# Patient Record
Sex: FEMALE | Race: AMERICAN INDIAN OR ALASKA NATIVE
[De-identification: names, ages, dates, MRNs, and addresses within clinical notes are randomized per-mention and may not be internally consistent; named-entity substitution may affect disease eponyms.]

---

## 2020-08-30 ENCOUNTER — HOSPITAL ENCOUNTER (EMERGENCY)
Dept: HOSPITAL 5 - ED | Age: 70
Discharge: OTHER INSTITUTION | End: 2020-08-30
Payer: SELF-PAY

## 2020-08-30 VITALS — DIASTOLIC BLOOD PRESSURE: 59 MMHG | SYSTOLIC BLOOD PRESSURE: 96 MMHG

## 2020-08-30 DIAGNOSIS — A41.89: ICD-10-CM

## 2020-08-30 DIAGNOSIS — I46.9: Primary | ICD-10-CM

## 2020-08-30 DIAGNOSIS — Z88.4: ICD-10-CM

## 2020-08-30 DIAGNOSIS — D64.9: ICD-10-CM

## 2020-08-30 LAB
%HYPO/RBC NFR BLD AUTO: (no result) %
ALBUMIN SERPL-MCNC: 2.4 G/DL (ref 3.9–5)
ALT SERPL-CCNC: 57 UNITS/L (ref 7–56)
ANISOCYTOSIS BLD QL SMEAR: (no result)
APTT BLD: 36.4 SEC. (ref 24.2–36.6)
BAND NEUTROPHILS # (MANUAL): 1.6 K/MM3
BILIRUB DIRECT SERPL-MCNC: < 0.2 MG/DL (ref 0–0.2)
BUN SERPL-MCNC: 24 MG/DL (ref 7–17)
BUN/CREAT SERPL: 12 %
CALCIUM SERPL-MCNC: 7.9 MG/DL (ref 8.4–10.2)
HCO3 BLDA-SCNC: 13.5 MMOL/L (ref 20–26)
HCT VFR BLD CALC: 14 % (ref 30.3–42.9)
HCT VFR BLD CALC: 24.6 % (ref 30.3–42.9)
HDLC SERPL-MCNC: 41 MG/DL (ref 40–59)
HEMOLYSIS INDEX: 5
HGB BLD-MCNC: 4.5 GM/DL (ref 10.1–14.3)
HGB BLD-MCNC: 8 GM/DL (ref 10.1–14.3)
INR PPP: 1.2 (ref 0.87–1.13)
MCHC RBC AUTO-ENTMCNC: 32 % (ref 30–34)
MCV RBC AUTO: 94 FL (ref 79–97)
MYELOCYTES # (MANUAL): 0 K/MM3
PCO2 BLDA: 26.3 MM HG
PH BLDA: 7.33 PH UNITS (ref 7.35–7.45)
PLATELET # BLD: 180 K/MM3 (ref 140–440)
PO2 BLDA: 435 MM HG (ref 80–90)
PROMYELOCYTES # (MANUAL): 0 K/MM3
RBC # BLD AUTO: 1.5 M/MM3 (ref 3.65–5.03)
TOTAL CELLS COUNTED BLD: 100

## 2020-08-30 PROCEDURE — 80048 BASIC METABOLIC PNL TOTAL CA: CPT

## 2020-08-30 PROCEDURE — 70450 CT HEAD/BRAIN W/O DYE: CPT

## 2020-08-30 PROCEDURE — 74176 CT ABD & PELVIS W/O CONTRAST: CPT

## 2020-08-30 PROCEDURE — 87205 SMEAR GRAM STAIN: CPT

## 2020-08-30 PROCEDURE — 87040 BLOOD CULTURE FOR BACTERIA: CPT

## 2020-08-30 PROCEDURE — 85018 HEMOGLOBIN: CPT

## 2020-08-30 PROCEDURE — 80076 HEPATIC FUNCTION PANEL: CPT

## 2020-08-30 PROCEDURE — 86901 BLOOD TYPING SEROLOGIC RH(D): CPT

## 2020-08-30 PROCEDURE — P9016 RBC LEUKOCYTES REDUCED: HCPCS

## 2020-08-30 PROCEDURE — 86850 RBC ANTIBODY SCREEN: CPT

## 2020-08-30 PROCEDURE — 96361 HYDRATE IV INFUSION ADD-ON: CPT

## 2020-08-30 PROCEDURE — 36415 COLL VENOUS BLD VENIPUNCTURE: CPT

## 2020-08-30 PROCEDURE — 85014 HEMATOCRIT: CPT

## 2020-08-30 PROCEDURE — 94002 VENT MGMT INPAT INIT DAY: CPT

## 2020-08-30 PROCEDURE — 71045 X-RAY EXAM CHEST 1 VIEW: CPT

## 2020-08-30 PROCEDURE — 31500 INSERT EMERGENCY AIRWAY: CPT

## 2020-08-30 PROCEDURE — 99285 EMERGENCY DEPT VISIT HI MDM: CPT

## 2020-08-30 PROCEDURE — 85730 THROMBOPLASTIN TIME PARTIAL: CPT

## 2020-08-30 PROCEDURE — 85610 PROTHROMBIN TIME: CPT

## 2020-08-30 PROCEDURE — 80061 LIPID PANEL: CPT

## 2020-08-30 PROCEDURE — 85007 BL SMEAR W/DIFF WBC COUNT: CPT

## 2020-08-30 PROCEDURE — 86920 COMPATIBILITY TEST SPIN: CPT

## 2020-08-30 PROCEDURE — 36430 TRANSFUSION BLD/BLD COMPNT: CPT

## 2020-08-30 PROCEDURE — 85025 COMPLETE CBC W/AUTO DIFF WBC: CPT

## 2020-08-30 PROCEDURE — 93005 ELECTROCARDIOGRAM TRACING: CPT

## 2020-08-30 PROCEDURE — 85379 FIBRIN DEGRADATION QUANT: CPT

## 2020-08-30 PROCEDURE — 86900 BLOOD TYPING SEROLOGIC ABO: CPT

## 2020-08-30 PROCEDURE — 82803 BLOOD GASES ANY COMBINATION: CPT

## 2020-08-30 PROCEDURE — 96365 THER/PROPH/DIAG IV INF INIT: CPT

## 2020-08-30 PROCEDURE — 84484 ASSAY OF TROPONIN QUANT: CPT

## 2020-08-30 PROCEDURE — 82140 ASSAY OF AMMONIA: CPT

## 2020-08-30 NOTE — XRAY REPORT
CHEST 1 VIEW



INDICATION / CLINICAL INFORMATION:

ETT placement.



COMPARISON: 

8/30/2020.



FINDINGS:



SUPPORT DEVICES: Interval placement of ET tube with its tip approximately 2.6 cm above the level the 
maria luisa.



HEART / MEDIASTINUM: Stable since prior exam. 



LUNGS / PLEURA: Persistent mild central pulmonary vascular congestion. No pneumothorax. 



ADDITIONAL FINDINGS: No significant additional findings.



IMPRESSION:

1. Interval placement of ET tube with its tip approximately 2.6 cm above level of maria luisa.

2. Previously noted findings of congestive heart failure mild central pulmonary vascular congestion a
re similar.



Signer Name: Jorge Crawley MD 

Signed: 8/30/2020 10:21 AM

Workstation Name: [id number]

## 2020-08-30 NOTE — XRAY REPORT
CHEST 1 VIEW



INDICATION / CLINICAL INFORMATION:

Dyspnea.



COMPARISON: 

None available.



FINDINGS:



SUPPORT DEVICES: None.



HEART / MEDIASTINUM: Enlarged cardiac silhouette. 



LUNGS / PLEURA: No confluent infiltrate. Mild central pulmonary vascular congestion. No pneumothorax 
or pleural effusion. 



ADDITIONAL FINDINGS: No significant additional findings.



IMPRESSION:

1. Findings consistent with congestive heart failure and mild central pulmonary vascular congestion.



Signer Name: Jorge Crawley MD 

Signed: 8/30/2020 9:03 AM

Workstation Name: [id number]

## 2020-08-30 NOTE — CAT SCAN REPORT
CT ABDOMEN AND PELVIS WITHOUT CONTRAST



INDICATION:

ABDOMINAL PAIN.



TECHNIQUE:

Axial CT images were obtained through the abdomen and pelvis without IV contrast.  All CT scans at 
is location are performed using CT dose reduction for ALARA by means of automated exposure control. 



COMPARISON:

None available.



FINDINGS:

LOWER CHEST: Tiny left and small right pleural effusions with bibasilar atelectasis

LIVER: No significant abnormality.

GALLBLADDER: No significant abnormality.  

BILE DUCTS: No significant abnormality.

PANCREAS: No significant abnormality.

SPLEEN: No significant abnormality.

ADRENALS: No significant abnormality.

RIGHT KIDNEY and URETER: Moderately atrophic containing multiple tiny intrarenal vascular calcificati
ons and several small acquired cysts.

LEFT KIDNEY and URETER: Moderately atrophic containing several acquired cysts, largest of which measu
res 3 cm.



STOMACH and SMALL BOWEL: Moderately distended stomach containing air-fluid level. Multiple mildly dil
ated fluid-filled loops of proximal and mid small bowel with collapse of distal ileum characteristic 
for partial low-grade distal small bowel obstruction

COLON: Collapsed 

APPENDIX: No significant abnormality.  

PERITONEUM: No free fluid. No free air. Loculated fluid collection within the right hemipelvis measur
es 11.1 x 9.4 cm image 115 and extending into the right flank/right second bilobed collection with he
morrhagic layering fluid is seen measuring 7.3 x 11.7 cm image 93. Retroperitoneal space . Moderate s
ized hematoma with hematocrit level right lower quadrant subcutaneous soft tissues measures 3.9 x 9.0
 cm image 134.

LYMPH NODES: No significant adenopathy.

AORTA and ARTERIES: No significant abnormality. 

IVC and VEINS: No significant abnormality.



URINARY BLADDER: Slightly collapsed and markedly displaced into the left hemipelvis secondary to the 
right pelvic sidewall hematoma

REPRODUCTIVE ORGANS: 2 cm calcified uterine fibroid



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 No hydronephrosis.



SKELETAL SYSTEM: No significant abnormality.



IMPRESSION:

1. Large bilobed hematomas within the right hemipelvis extending into the right flank posterior to th
e transplant kidney with mass effect upon the transplant kidney and bladder with additional subcutane
ous hematoma within the right lower quadrant subcutaneous soft tissues.

2. Right lower quadrant pelvic transplant kidney contains a ureteral stent.

3. Probable partial low-grade distal small bowel obstruction



Signer Name: Demetrius Noble MD 

Signed: 8/30/2020 1:38 PM

Workstation Name: [id number]

## 2020-08-30 NOTE — CAT SCAN REPORT
CT HEAD WITHOUT CONTRAST



INDICATION / CLINICAL INFORMATION:

AMS.



TECHNIQUE:

All CT scans at this location are performed using CT dose reduction for ALARA by means of automated e
xposure control. 



COMPARISON:

Head CT 9/22/2008.



FINDINGS:

HEMORRHAGE: No evidence of recent intracranial hemorrhage or extra-axial fluid collection.

EXTRA-AXIAL SPACES: Cortical sulci and sylvian fissures are mildly enlarged reflecting a degree of pa
renchymal volume loss which is within normal limits for the patient's age of 70. Basilar cisterns hav
e an unremarkable appearance.

VENTRICULAR SYSTEM: The third and lateral ventricles are mildly enlarged reflecting presence of age r
elated parenchymal volume loss.

CEREBRAL PARENCHYMA: Periventricular and deep white matter lucency is observed. This is probably seco
ndary to microvascular ischemic change. There is no indication of recent infarction. A more focal are
a of decreased brain parenchymal attenuation is observed in the left gangliocapsular region at the si
te of a remote left basal ganglia hematoma which was demonstrated on head CT 9/22/2008.

MIDLINE SHIFT OR HERNIATION: There is no mass effect.

CEREBELLUM / BRAINSTEM: Brainstem has an unremarkable appearance. Age related cerebellar atrophy is n
oted.

MIDLINE STRUCTURES:Pituitary gland has an unremarkable appearance. No abnormalities are seen in the p
ineal region.

INTRACRANIAL VESSELS:Calcified atherosclerotic plaque is present along the course of the cavernous se
gments of both internal carotid arteries. Similar findings are seen at the distal vertebral arteries.


ORBITS: visualized portions of the orbits have an unremarkable appearance.

SOFT TISSUES of HEAD: No significant abnormality.

CALVARIUM: Evaluation of bone windows reveals no abnormalities.

PARANASAL SINUSES / MASTOID AIR CELLS: Paranasal sinuses are free from inflammatory mucosal disease. 
Mastoid air cells are normally pneumatized.



IMPRESSION:

1. No acute intracranial abnormality. 

2. Mild, age-related involutional changes of parenchymal volume loss and microvascular ischemia.



Signer Name: Huy Jain MD 

Signed: 8/30/2020 1:30 PM

Workstation Name: [id number]

## 2020-08-30 NOTE — EMERGENCY DEPARTMENT REPORT
ED General Adult HPI





- General


Stated complaint: UNRESPONSIVE


Time Seen by Provider: 08/30/20 08:04


Source: patient, EMS





- History of Present Illness


Initial comments: 





Ms. Hedrick is a 70 years old female with a recent kidney transplant 2 weeks ago 

at [place] [facility].  Patient brought to the hospital via EMS from home..

Family stated that patient was discharged yesterday.  Family stated that patient

went to the bathroom and when she came back she collapsed.  EMS stated that 

patient was found on the floor gasping for breath.  Nasal airway inserted by EMS

and patient started on positive pressure with immediate response by the patient.

 Upon arrival to the ER patient is alert and oriented with oxygen saturation of 

100%.  Patient initial blood pressure was 80/42 improved significantly with 

fluids.  Patient kept asking for Percocet.  Sepsis protocol immediately 

initiated.





- Related Data


                                    Allergies











Allergy/AdvReac Type Severity Reaction Status Date / Time


 


codeine Allergy  Unknown Verified 08/30/20 08:30














ED Review of Systems


ROS: 


Stated complaint: UNRESPONSIVE


Other details as noted in HPI





Comment: All other systems reviewed and negative


Constitutional: denies: chills, fever


Respiratory: shortness of breath.  denies: cough, SOB with exertion, SOB at rest


Cardiovascular: denies: chest pain, palpitations


Gastrointestinal: denies: abdominal pain, nausea, vomiting





ED Physical Exam





- General


General appearance: obtunded, in distress





- Head


Head exam: Present: atraumatic, normocephalic, normal inspection





- Eye


Eye exam: Present: normal appearance, PERRL





- ENT


ENT exam: Present: normal exam, mucous membranes dry





- Neck


Neck exam: Present: normal inspection, full ROM.  Absent: tenderness, 

meningismus





- Respiratory


Respiratory exam: Present: respiratory distress, rales, rhonchi, decreased 

breath sounds





- Cardiovascular


Cardiovascular Exam: Present: regular rate, normal rhythm, normal heart sounds





- GI/Abdominal


GI/Abdominal exam: Present: soft, normal bowel sounds.  Absent: distended, 

tenderness, guarding, rebound, rigid, diminished bowel sounds, organomegaly, 

mass, bruit, pulsatile mass, hernia





- Extremities Exam


Extremities exam: Present: normal inspection, full ROM, normal capillary refill.

 Absent: pedal edema, calf tenderness





- Back Exam


Back exam: Present: normal inspection, full ROM.  Absent: CVA tenderness (R), 

CVA tenderness (L)





- Neurological Exam


Neurological exam: Present: altered, CN II-XII intact





- Skin


Skin exam: Present: warm, dry, intact, normal color





ED Course


                                   Vital Signs











  08/30/20 08/30/20 08/30/20





  08:08 08:14 08:15


 


Temperature   


 


Pulse Rate 94 H  100 H


 


Respiratory 26 H  29 H





Rate   


 


Blood Pressure   118/59


 


Blood Pressure  117/55 





[Left]   


 


O2 Sat by Pulse 100  100





Oximetry   














  08/30/20 08/30/20 08/30/20





  08:30 08:45 09:01


 


Temperature 93.2 F L  


 


Pulse Rate  101 H 151 H


 


Respiratory 25 H 29 H 24





Rate   


 


Blood Pressure 99/51 97/49 97/49


 


Blood Pressure   





[Left]   


 


O2 Sat by Pulse 100 100 83 L





Oximetry   














  08/30/20 08/30/20 08/30/20





  09:15 09:25 09:30


 


Temperature   


 


Pulse Rate 116 H 120 H 119 H


 


Respiratory 14  20





Rate   


 


Blood Pressure 102/63 102/63 122/73


 


Blood Pressure   





[Left]   


 


O2 Sat by Pulse 91 100 100





Oximetry   














  08/30/20 08/30/20 08/30/20





  09:41 09:51 10:00


 


Temperature   


 


Pulse Rate 115 H 111 H 105 H


 


Respiratory 20 20 20





Rate   


 


Blood Pressure 122/73 95/62 93/64


 


Blood Pressure   





[Left]   


 


O2 Sat by Pulse 100 100 100





Oximetry   














  08/30/20 08/30/20 08/30/20





  10:11 10:16 10:21


 


Temperature   


 


Pulse Rate 100 H 100 H 99 H


 


Respiratory 20 20 20





Rate   


 


Blood Pressure 93/64 100/66 100/66


 


Blood Pressure   





[Left]   


 


O2 Sat by Pulse 100 100 100





Oximetry   














  08/30/20 08/30/20 08/30/20





  10:30 10:51 11:00


 


Temperature   


 


Pulse Rate 94 H 92 H 94 H


 


Respiratory 20 20 20





Rate   


 


Blood Pressure 133/83 144/83 156/96


 


Blood Pressure   





[Left]   


 


O2 Sat by Pulse 100 100 100





Oximetry   














  08/30/20 08/30/20 08/30/20





  11:15 11:30 11:45


 


Temperature   


 


Pulse Rate 88 91 H 93 H


 


Respiratory 17 19 18





Rate   


 


Blood Pressure 144/90 137/84 121/77


 


Blood Pressure   





[Left]   


 


O2 Sat by Pulse 100 100 100





Oximetry   














  08/30/20 08/30/20 08/30/20





  12:01 12:15 12:30


 


Temperature   


 


Pulse Rate 101 H 93 H 93 H


 


Respiratory 16 14 20





Rate   


 


Blood Pressure 121/97 94/62 90/61


 


Blood Pressure   





[Left]   


 


O2 Sat by Pulse 98 99 100





Oximetry   














  08/30/20 08/30/20 08/30/20





  13:11 13:15 13:30


 


Temperature   94.5 F L


 


Pulse Rate 94 H 95 H 95 H


 


Respiratory 13 16 12





Rate   


 


Blood Pressure 90/61 90/61 85/54


 


Blood Pressure   





[Left]   


 


O2 Sat by Pulse   





Oximetry   














  08/30/20 08/30/20 08/30/20





  13:45 14:00 14:15


 


Temperature   


 


Pulse Rate 92 H 92 H 92 H


 


Respiratory 23 15 18





Rate   


 


Blood Pressure 87/53 101/60 101/60


 


Blood Pressure   





[Left]   


 


O2 Sat by Pulse 99 97 99





Oximetry   














  08/30/20 08/30/20 08/30/20





  14:30 14:45 14:50


 


Temperature   


 


Pulse Rate 95 H 98 H 101 H


 


Respiratory 24 23 





Rate   


 


Blood Pressure 110/64 112/57 110/62


 


Blood Pressure   





[Left]   


 


O2 Sat by Pulse 99 100 100





Oximetry   














  08/30/20 08/30/20 08/30/20





  15:00 15:15 15:30


 


Temperature   


 


Pulse Rate 101 H 102 H 102 H


 


Respiratory 21 22 20





Rate   


 


Blood Pressure 102/60 94/60 96/59


 


Blood Pressure   





[Left]   


 


O2 Sat by Pulse 99 100 99





Oximetry   














- Reevaluation(s)


Reevaluation #1: 





08/30/20 11:00


08:55 AM.  I was called to the room by the nurse to evaluate Ms. Hedrick as he 

became unresponsive.  Upon arrival patient is not breathing and no pulse.  CODE 

BLUE immediately initiated.  Patient started on CPR.  ACLS protocol initiated.  

Patient intubated by me using a glide scope.  Patient received 1 round of CPR 

and 1 mg of epi.  Patient also received calcium chloride and sodium bicarbonate 

for possible hyperkalemia. Patient found to have a hemoglobin of 4.  Blood 

transfusion initiated.  For further information please refer to code sheets.


Reevaluation #2: 





08/30/20 11:47


I discussed the patient with patient daughter Ms. Diandra Méndez with a phone 

number of [phone].  She stated that her mother was discharged yesterday from 

the hospital but since she got home she was complaining of pain at the site of 

the operation.  She stated that this morning she woke up and she went to the 

bathroom but she collapsed before she reached bed.  She denied any complaint of 

chest pain or shortness of breath.  She also denied any fever or chills.  No 

nausea or vomiting.  I informed Ms. Neal about her mother's situation and 

that her condition is critical and she will need to be transfer to [facility] 

[facility] for further management.





- Intubation


Time Out Performed: Yes


Sedative: Etomidate


Paralytic: Rocuronium


Laryngoscope: Nadira


Size: 3


ET Tube Size: 7.5


Tube Secured Depth (cm): 24


Tube Secured Location: teeth


Tube Placement Confirmation: visualized tube passing t, equal breath sounds 

bilat, no breath sounds over epi, confirmation by capnometr


Patient Tolerated Procedure: well, no complications


Intubation Complications: none





ED Medical Decision Making





- Lab Data


Result diagrams: 


                                 08/30/20 14:02





                                 08/30/20 08:37





- EKG Data


-: EKG Interpreted by Me


EKG shows normal: sinus rhythm


Rate: normal





- EKG Data


Interpretation: no acute changes





- Radiology Data


Radiology results: report reviewed





- Medical Decision Making





Ms. Hedrick is a 70 years old female with a recent kidney transplant 2 weeks ago 

at [facility] [place] [facility].  Patient brought to the hospital via EMS from home..

 Family stated that patient was discharged yesterday.  Family stated that 

patient went to the bathroom and when she came back she collapsed.  EMS stated 

that patient was found on the floor gasping for breath.  Nasal airway inserted 

by EMS and patient started on positive pressure with immediate response by the 

patient.  Upon arrival to the ER patient is alert and oriented with oxygen 

saturation of 100%.  Patient initial blood pressure was 80/42 improved 

significantly with fluids.  Patient kept asking for Percocet.  Sepsis protocol 

immediately initiated.





Patient found to have a hemoglobin of 4.5.  Patient transfused 2 units of PRBC. 

 Patient received 3 L of normal saline.  CT abdomen and pelvis showed a large 

hematoma in the right hemipelvis causing pressure on the transplanted kidney and

 intestine causing a small bowel obstruction also.  I discussed the patient with

 Dr. Lainez, intensivist at [facility] and he accepted the patient to be 

transferred to his service.


Critical Care Time: Yes


Critical care time in (mins) excluding proc time.: 60


Critical care attestation.: 


If time is entered above; I have spent that time in minutes in the direct care 

of this critically ill patient, excluding procedure time.








ED Disposition


Clinical Impression: 


 Cardiopulmonary arrest, Severe anemia, Sepsis, Intra-abdominal hematoma





Disposition: DC/TX-70 ANOTHER TYPE HLTHCARE


Is pt being admited?: No


Condition: Stable


Referrals: 


PRIMARY CARE,MD [Primary Care Provider] - 3-5 Days